# Patient Record
Sex: MALE | Race: WHITE | NOT HISPANIC OR LATINO | Employment: FULL TIME | URBAN - METROPOLITAN AREA
[De-identification: names, ages, dates, MRNs, and addresses within clinical notes are randomized per-mention and may not be internally consistent; named-entity substitution may affect disease eponyms.]

---

## 2019-06-24 ENCOUNTER — TELEPHONE (OUTPATIENT)
Dept: NEUROLOGY | Facility: CLINIC | Age: 49
End: 2019-06-24

## 2019-06-24 DIAGNOSIS — G40.109 SEIZURE, TEMPORAL LOBE (HCC): Primary | ICD-10-CM

## 2019-06-30 ENCOUNTER — RESULTS ENCOUNTER (OUTPATIENT)
Dept: NEUROLOGY | Facility: CLINIC | Age: 49
End: 2019-06-30

## 2019-06-30 DIAGNOSIS — G40.109 SEIZURE, TEMPORAL LOBE (HCC): ICD-10-CM

## 2019-07-12 ENCOUNTER — OFFICE VISIT (OUTPATIENT)
Dept: NEUROLOGY | Facility: CLINIC | Age: 49
End: 2019-07-12

## 2019-07-12 ENCOUNTER — TELEPHONE (OUTPATIENT)
Dept: NEUROLOGY | Facility: CLINIC | Age: 49
End: 2019-07-12

## 2019-07-12 VITALS
WEIGHT: 293 LBS | HEART RATE: 60 BPM | BODY MASS INDEX: 41.95 KG/M2 | HEIGHT: 70 IN | SYSTOLIC BLOOD PRESSURE: 135 MMHG | DIASTOLIC BLOOD PRESSURE: 81 MMHG

## 2019-07-12 DIAGNOSIS — G47.33 OBSTRUCTIVE SLEEP APNEA: Primary | ICD-10-CM

## 2019-07-12 PROBLEM — Z83.3 FAMILY HISTORY OF DIABETES MELLITUS: Status: ACTIVE | Noted: 2019-07-12

## 2019-07-12 PROBLEM — G40.909 SEIZURE DISORDER (HCC): Status: ACTIVE | Noted: 2019-07-12

## 2019-07-12 PROBLEM — G47.30 SLEEP APNEA: Status: ACTIVE | Noted: 2019-07-12

## 2019-07-12 PROBLEM — I10 HYPERTENSION: Status: ACTIVE | Noted: 2019-07-12

## 2019-07-12 PROCEDURE — 99214 OFFICE O/P EST MOD 30 MIN: CPT | Performed by: PSYCHIATRY & NEUROLOGY

## 2019-07-12 RX ORDER — HYDROCHLOROTHIAZIDE 25 MG/1
25 TABLET ORAL DAILY
Refills: 4 | COMMUNITY
Start: 2019-06-20

## 2019-07-12 RX ORDER — ATENOLOL 25 MG/1
25 TABLET ORAL 2 TIMES DAILY
Refills: 4 | COMMUNITY
Start: 2019-06-20

## 2019-07-12 RX ORDER — CARBAMAZEPINE 200 MG/1
200 TABLET ORAL 4 TIMES DAILY
Refills: 11 | COMMUNITY
Start: 2019-05-25 | End: 2019-07-12 | Stop reason: SDUPTHER

## 2019-07-12 RX ORDER — CARBAMAZEPINE 200 MG/1
200 TABLET ORAL 4 TIMES DAILY
Qty: 60 TABLET | Refills: 11 | Status: SHIPPED | OUTPATIENT
Start: 2019-07-12 | End: 2020-06-15

## 2019-07-12 RX ORDER — ASPIRIN 81 MG/1
TABLET ORAL
COMMUNITY
Start: 2017-05-31

## 2019-07-12 RX ORDER — SIMVASTATIN 20 MG
20 TABLET ORAL
Refills: 12 | COMMUNITY
Start: 2019-06-20 | End: 2021-09-27 | Stop reason: SDUPTHER

## 2019-07-12 NOTE — PROGRESS NOTES
Sleep medicine follow-up visit    Zenon Jerry   1970  49 y.o. male   DATE OF SERVICE: 7/12/2019       Patient  Is here for follow up on MATILDE, he is currently using full face mask he uses  Ruel ARAGON.  He is sleeping well.   Having problems with ffm wishes to try nasal mask , has used in the past  SLEEP TESTING HISTORY:    On NPSG at [facility] , 2004 patient had Moderate obstructive sleep apnea syndrome with RDI of 79 per sleep hour, minimum SpO2 of 94%    The compliance data reviewed and the patient is on CPAP therapy at 13-20 cm/H2O and compliance data indicates excellent compliance with 90% usage for more than 4 hours with an average usage of 5 hours 35 minutes. AHI down to 12.7. Large air leak [id number] with CPAP therapy and mean CPAP pressure 13 cm of water.  The patient's hypersomnia also resolved with [place] Sleepiness Scale score of 6 with CPAP therapy    No seizures  Labs from this am pending     Review of Systems   Constitutional: Negative for activity change and appetite change.   HENT: Negative for mouth sores and postnasal drip.    Eyes: Negative for pain and itching.   Respiratory: Negative for cough and choking.    Cardiovascular: Negative for chest pain and leg swelling.   Gastrointestinal: Negative for abdominal pain and anal bleeding.   Endocrine: Negative for cold intolerance and heat intolerance.   Genitourinary: Negative for difficulty urinating and testicular pain.   Musculoskeletal: Negative for neck pain.   Neurological: Negative for seizures and headaches.   Psychiatric/Behavioral: Negative for confusion, dysphoric mood and sleep disturbance.     I reviewed and addressed ROS entered by MA.      Current Outpatient Medications:   •  aspirin 81 MG EC tablet, ASPIRIN 81 MG TBEC, Disp: , Rfl:   •  atenolol (TENORMIN) 25 MG tablet, Take 25 mg by mouth 2 (Two) Times a Day., Disp: , Rfl: 4  •  carBAMazepine (TEGretol) 200 MG tablet, Take 200 mg by mouth 4 (Four) Times a Day., Disp: ,  Rfl: 11  •  hydrochlorothiazide (HYDRODIURIL) 25 MG tablet, Take 25 mg by mouth Daily. for blood pressure, Disp: , Rfl: 4  •  simvastatin (ZOCOR) 20 MG tablet, Take 20 mg by mouth every night at bedtime., Disp: , Rfl: 12  Allergies not on file     PHYSICAL EXAMINATION:  Vitals:    07/12/19 1100   BP: 135/81   Pulse: 60      Body mass index is 42.04 kg/m².       HEENT: Normal.    CARDIAC: Normal.   LUNGS: Clear to auscultation.   EXTREMITIES: No edema.     IMPRESSION: Patient with obstructive sleep apnea syndrome not doing well in terms of air leak, will change to nasal mask , decrease pressure to 10-20, check scr in one month if not improved then in lab titration study, may need BiPAP.    Seizure do , doing well continue tegretol will review labs when available    Obesity, pt encouraged to lose weight.       EPWORTH SLEEPINESS SCALE  Sitting and reading  0  WatchingTV  1  Sitting, inactive, in a public place  0  As a passenger in a car for 1 hour w/o a break  0  Lying down to rest in the afternoon  3  Sitting and talking to someone  0  Sitting quietly after a lunch  3  In a car, while stopped for traffic or a light  0  Total 6        This document has been electronically signed by Joseph Seipel, MD on July 12, 2019 11:28 AM

## 2020-06-15 RX ORDER — CARBAMAZEPINE 200 MG/1
TABLET ORAL
Qty: 60 TABLET | Refills: 11 | Status: SHIPPED | OUTPATIENT
Start: 2020-06-15 | End: 2020-07-30 | Stop reason: SDUPTHER

## 2020-07-06 ENCOUNTER — TELEPHONE (OUTPATIENT)
Dept: NEUROLOGY | Facility: CLINIC | Age: 50
End: 2020-07-06

## 2020-07-06 DIAGNOSIS — G40.109 SEIZURE, TEMPORAL LOBE: Primary | ICD-10-CM

## 2020-07-06 NOTE — TELEPHONE ENCOUNTER
Forward to Dr. Seipel so he can put in the order for the labs and will fax to [phone]  cbv  cmp  carbamazepine

## 2020-07-06 NOTE — TELEPHONE ENCOUNTER
PT CALLED  AND STATED HE NEEDED LAB WORK FAXED TO [facility] IN [place] .  HE DIDN'T HAVE A FAX NUMBER FOR THEM HIS BEST CALL BACK NUMBER [phone]

## 2020-07-23 DIAGNOSIS — G40.109 SEIZURE, TEMPORAL LOBE (HCC): Primary | ICD-10-CM

## 2020-07-30 ENCOUNTER — OFFICE VISIT (OUTPATIENT)
Dept: NEUROLOGY | Facility: CLINIC | Age: 50
End: 2020-07-30

## 2020-07-30 VITALS
HEIGHT: 70 IN | BODY MASS INDEX: 36.36 KG/M2 | WEIGHT: 254 LBS | SYSTOLIC BLOOD PRESSURE: 124 MMHG | TEMPERATURE: 98 F | HEART RATE: 79 BPM | DIASTOLIC BLOOD PRESSURE: 79 MMHG

## 2020-07-30 DIAGNOSIS — G47.33 OBSTRUCTIVE SLEEP APNEA SYNDROME: Primary | ICD-10-CM

## 2020-07-30 DIAGNOSIS — G40.909 SEIZURE DISORDER (HCC): ICD-10-CM

## 2020-07-30 PROCEDURE — 99214 OFFICE O/P EST MOD 30 MIN: CPT | Performed by: PSYCHIATRY & NEUROLOGY

## 2020-07-30 RX ORDER — CARBAMAZEPINE 200 MG/1
200 TABLET ORAL 4 TIMES DAILY
Qty: 120 TABLET | Refills: 11 | Status: SHIPPED | OUTPATIENT
Start: 2020-07-30 | End: 2021-08-07

## 2020-07-30 NOTE — PROGRESS NOTES
Sleep medicine follow-up visit    Zenon Jerry   1970  50 y.o. male   DATE OF SERVICE: 7/30/2020     MATILDE, yearly f/u for CPAP compliance, he is currently using full face mask and goes through [employer] for supplies.   He is sleeping well.     SLEEP TESTING HISTORY:    On NPSG at [facility] , 2004 patient had Moderate obstructive sleep apnea syndrome with RDI of 79 per sleep hour, minimum SpO2 of 94%    The compliance data reviewed and the patient is on CPAP therapy at 10-20 cm/H2O and compliance data19% usage for more than 4 hours with an average usage of 3 hours 35 minutes. AHI down to 17     Pt is not sleepy w or wo using cpap    Seizure disorder, doing well with tegretol no seizures.    Obesity, BMI-36.4    Review of Systems   Constitutional: Negative for activity change and appetite change.   HENT: Negative for mouth sores and postnasal drip.    Eyes: Negative for pain and itching.   Respiratory: Positive for apnea. Negative for cough and choking.    Cardiovascular: Negative for chest pain and leg swelling.   Gastrointestinal: Negative for abdominal pain and anal bleeding.   Endocrine: Negative for cold intolerance and heat intolerance.   Genitourinary: Negative for difficulty urinating and testicular pain.   Musculoskeletal: Negative for neck pain.   Neurological: Negative for seizures and headaches.   Psychiatric/Behavioral: Negative for confusion, dysphoric mood and sleep disturbance.     I reviewed and addressed ROS entered by MA.        The following portions of the patient's history were reviewed and updated as appropriate: allergies, current medications, past family history, past medical history, past social history, past surgical history and problem list.      Family History   Problem Relation Age of Onset   • Seizures Brother    • Diabetes Other    • Heart disease Other    • Hypertension Other    • Cancer Other        Past Medical History:   Diagnosis Date   • Fracture of leg     right   • History of  blood clotting disorder    • Hyperlipidemia    • Hypertension    • Seizure disorder (CMS/[facility])     last seizure 07/10/2008   • Sleep apnea     ahi 79 on npsg 2004, auto cpap 9-15       Social History     Socioeconomic History   • Marital status: Single     Spouse name: Not on file   • Number of children: Not on file   • Years of education: Not on file   • Highest education level: Not on file   Tobacco Use   • Smoking status: Never Smoker   • Smokeless tobacco: Never Used   Substance and Sexual Activity   • Alcohol use: Not Currently   • Drug use: Never   • Sexual activity: Defer         Current Outpatient Medications:   •  aspirin 81 MG EC tablet, ASPIRIN 81 MG TBEC, Disp: , Rfl:   •  atenolol (TENORMIN) 25 MG tablet, Take 25 mg by mouth 2 (Two) Times a Day., Disp: , Rfl: 4  •  carBAMazepine (TEGretol) 200 MG tablet, Take 1 tablet by mouth 4 (Four) Times a Day., Disp: 120 tablet, Rfl: 11  •  hydrochlorothiazide (HYDRODIURIL) 25 MG tablet, Take 25 mg by mouth Daily. for blood pressure, Disp: , Rfl: 4  •  simvastatin (ZOCOR) 20 MG tablet, Take 20 mg by mouth every night at bedtime., Disp: , Rfl: 12    No Known Allergies     PHYSICAL EXAMINATION:  Vitals:    07/30/20 1349   BP: 124/79   Pulse: 79   Temp: 98 °F (36.7 °C)      Body mass index is 36.45 kg/m².       HEENT: Normal.      EXTREMITIES: No edema.     EPWORTH SLEEPINESS SCALE  Sitting and reading  0  WatchingTV  2  Sitting, inactive, in a public place  0  As a passenger in a car for 1 hour w/o a break  0  Lying down to rest in the afternoon  2  Sitting and talking to someone  0  Sitting quietly after a lunch  0  In a car, while stopped for traffic or a light  0  Total 4        IMPRESSION:     Patient with obstructive sleep apnea  Not compliant with pap, severe air leak and unknown control of events due to massive air leak  ---will order npsg and re titration study    Seizure do   --continue tegretol level was 9.5 cbc is ok    This document has been electronically  signed by Joseph Seipel, MD on July 30, 2020 14:30

## 2020-07-31 ENCOUNTER — TELEPHONE (OUTPATIENT)
Dept: NEUROLOGY | Facility: CLINIC | Age: 50
End: 2020-07-31

## 2020-07-31 DIAGNOSIS — G47.33 OBSTRUCTIVE SLEEP APNEA SYNDROME: Primary | ICD-10-CM

## 2020-07-31 NOTE — TELEPHONE ENCOUNTER
----- Message from Joseph F Seipel, MD sent at 7/30/2020  2:19 PM EDT -----  full face mask and goes through [employer]

## 2020-08-06 ENCOUNTER — RESULTS ENCOUNTER (OUTPATIENT)
Dept: NEUROLOGY | Facility: CLINIC | Age: 50
End: 2020-08-06

## 2020-08-06 DIAGNOSIS — G40.109 SEIZURE, TEMPORAL LOBE (HCC): ICD-10-CM

## 2020-08-22 DIAGNOSIS — G47.33 OBSTRUCTIVE SLEEP APNEA SYNDROME: Primary | ICD-10-CM

## 2020-08-24 ENCOUNTER — TELEPHONE (OUTPATIENT)
Dept: NEUROLOGY | Facility: CLINIC | Age: 50
End: 2020-08-24

## 2020-08-24 NOTE — TELEPHONE ENCOUNTER
ESTEFANIA,  [phone]  YOU CAN LEAVE A MESSAGE    WANTS TO KNOW HOW MUCH HIS COST ON THE SLEEP STUDY IS GOING TO BE. WAS IS GOING TOWARDS  DEDUCTIBLE.    HE GOT PAPERS BUT DID NOT UNDERSTAND THEM

## 2020-09-03 ENCOUNTER — TELEPHONE (OUTPATIENT)
Dept: NEUROLOGY | Facility: CLINIC | Age: 50
End: 2020-09-03

## 2020-09-03 DIAGNOSIS — G47.33 OBSTRUCTIVE SLEEP APNEA: Primary | ICD-10-CM

## 2020-09-10 ENCOUNTER — TELEPHONE (OUTPATIENT)
Dept: NEUROLOGY | Facility: CLINIC | Age: 50
End: 2020-09-10

## 2020-09-10 DIAGNOSIS — G47.33 OBSTRUCTIVE SLEEP APNEA: Primary | ICD-10-CM

## 2020-09-10 NOTE — TELEPHONE ENCOUNTER
Pt called states he is wearing pap 7-8 hrs almost every night and the pap isn't picking up the time. Report looks odd. Will have [place] check pap.

## 2020-09-16 ENCOUNTER — TELEPHONE (OUTPATIENT)
Dept: NEUROLOGY | Facility: CLINIC | Age: 50
End: 2020-09-16

## 2020-09-16 DIAGNOSIS — G47.33 OBSTRUCTIVE SLEEP APNEA: Primary | ICD-10-CM

## 2020-10-06 ENCOUNTER — TELEPHONE (OUTPATIENT)
Dept: NEUROLOGY | Facility: CLINIC | Age: 50
End: 2020-10-06

## 2020-10-06 DIAGNOSIS — G47.33 OBSTRUCTIVE SLEEP APNEA: Primary | ICD-10-CM

## 2021-08-03 DIAGNOSIS — G40.909 SEIZURE DISORDER (HCC): Primary | ICD-10-CM

## 2021-08-07 RX ORDER — CARBAMAZEPINE 200 MG/1
TABLET ORAL
Qty: 120 TABLET | Refills: 1 | Status: SHIPPED | OUTPATIENT
Start: 2021-08-07 | End: 2021-08-30

## 2021-08-30 RX ORDER — CARBAMAZEPINE 200 MG/1
TABLET ORAL
Qty: 120 TABLET | Refills: 1 | Status: SHIPPED | OUTPATIENT
Start: 2021-08-30 | End: 2021-09-17 | Stop reason: SDUPTHER

## 2021-09-17 ENCOUNTER — TELEPHONE (OUTPATIENT)
Dept: NEUROLOGY | Facility: CLINIC | Age: 51
End: 2021-09-17

## 2021-09-17 RX ORDER — CARBAMAZEPINE 200 MG/1
200 TABLET ORAL 4 TIMES DAILY
Qty: 120 TABLET | Refills: 0 | Status: SHIPPED | OUTPATIENT
Start: 2021-09-17 | End: 2021-09-27 | Stop reason: SDUPTHER

## 2021-09-17 NOTE — TELEPHONE ENCOUNTER
Caller: Zenon Jerry    Relationship: Self    Additional notes: PATIENT WOULD LIKE LAB ORDERS FAXED TO [facility].   FAX: [phone]     THANKS

## 2021-09-24 NOTE — PROGRESS NOTES
"Chief Complaint  Sleep Apnea    Subjective          Zenon Jerry presents to [facility] NEUROLOGY  History of Present Illness  MATILDE, yearly f/u for CPAP compliance,patient states he is benefiting from pap therapy, he currently using full face mask and goes through [employer] for supplies.       Sleep testing history:    On NPSG at [facility] , 2004 patient had Moderate obstructive sleep apnea syndrome with RDI of 79 per sleep hour, minimum SpO2 of 94%    PAP download:  The patient is on CPAP therapy at 10-20 cm/H2O.   Data indicates compliance. With 69% usage for more than 4 hours with an average usage of 5 hours 27 minutes. AHI down to 5.9 .  Average pressures 10.0.  Average large leak 5hr.       [place] Sleepiness Scale:  Sitting and reading 1 WatchingTV 2  Sitting, inactive, in a public place 0  As a passenger in a car for 1 hour w/o a break  1  Lying down to rest in the afternoon  3  Sitting and talking to someone  0  Sitting quietly after a lunch  1  In a car, while stopped for traffic or a light  0  Total 8  -  Seizure disorder, doing well with tegretol  200mg qid no side effects no seizures since 2008    Review of Systems   Constitutional: Negative for chills and fever.   HENT: Negative for sinus pressure and sinus pain.    Eyes: Negative for pain and itching.   Respiratory: Negative for cough and shortness of breath.    Cardiovascular: Negative for chest pain.   Gastrointestinal: Negative for abdominal distention and abdominal pain.   Endocrine: Negative for cold intolerance and heat intolerance.   Genitourinary: Negative for frequency and urgency.   Musculoskeletal: Negative for neck pain and neck stiffness.   Neurological: Negative for dizziness and headaches.   Psychiatric/Behavioral: Negative for decreased concentration and sleep disturbance.         Objective   Vital Signs:   /75   Pulse 54   Temp 98.4 °F (36.9 °C) (Temporal)   Resp 16   Ht 177.8 cm (70\")   Wt 111 kg (244 lb)   " BMI 35.01 kg/m²     Physical Exam   Result Review :                 Assessment and Plan    Diagnoses and all orders for this visit:    1. Obstructive sleep apnea syndrome (Primary)    Other orders  -     carBAMazepine (TEGretol) 200 MG tablet; Take 1 tablet by mouth 4 (Four) Times a Day.  Dispense: 120 tablet; Refill: 11    pt informed of the CPAP recallThe patient is compliant with and benefiting from PAP therapy.  Needs mask fit due to excessive air leak    Continue tegretal  Cbc na and teg level all ok         Follow Up   Return in about 1 year (around 9/27/2022).    Patient was given instructions and counseling regarding his condition or for health maintenance advice. Please see specific information pulled into the AVS if appropriate.       This document has been electronically signed by Joseph Seipel, MD on September 27, 2021 14:22 EDT

## 2021-09-27 ENCOUNTER — OFFICE VISIT (OUTPATIENT)
Dept: NEUROLOGY | Facility: CLINIC | Age: 51
End: 2021-09-27

## 2021-09-27 VITALS
BODY MASS INDEX: 34.93 KG/M2 | TEMPERATURE: 98.4 F | RESPIRATION RATE: 16 BRPM | HEART RATE: 54 BPM | HEIGHT: 70 IN | SYSTOLIC BLOOD PRESSURE: 115 MMHG | DIASTOLIC BLOOD PRESSURE: 75 MMHG | WEIGHT: 244 LBS

## 2021-09-27 DIAGNOSIS — G47.33 OBSTRUCTIVE SLEEP APNEA SYNDROME: Primary | ICD-10-CM

## 2021-09-27 PROBLEM — G40.909 SEIZURE DISORDER (HCC): Status: RESOLVED | Noted: 2019-07-12 | Resolved: 2021-09-27

## 2021-09-27 PROBLEM — E78.5 HYPERLIPIDEMIA: Status: ACTIVE | Noted: 2021-09-27

## 2021-09-27 PROCEDURE — 99214 OFFICE O/P EST MOD 30 MIN: CPT | Performed by: PSYCHIATRY & NEUROLOGY

## 2021-09-27 RX ORDER — ATORVASTATIN CALCIUM 40 MG/1
40 TABLET, FILM COATED ORAL
COMMUNITY
Start: 2021-07-31

## 2021-09-27 RX ORDER — CARBAMAZEPINE 200 MG/1
200 TABLET ORAL 4 TIMES DAILY
Qty: 120 TABLET | Refills: 11 | Status: SHIPPED | OUTPATIENT
Start: 2021-09-27 | End: 2022-10-25

## 2021-09-27 RX ORDER — COLLAGENASE SANTYL 250 [ARB'U]/G
OINTMENT TOPICAL
COMMUNITY
Start: 2021-08-19

## 2021-09-28 ENCOUNTER — TELEPHONE (OUTPATIENT)
Dept: NEUROLOGY | Facility: CLINIC | Age: 51
End: 2021-09-28

## 2021-09-28 DIAGNOSIS — G47.33 OBSTRUCTIVE SLEEP APNEA SYNDROME: Primary | ICD-10-CM

## 2021-09-28 NOTE — TELEPHONE ENCOUNTER
----- Message from Joseph F Seipel, MD sent at 9/27/2021  2:16 PM EDT -----   face mask and goes through [facility]

## 2021-09-28 NOTE — TELEPHONE ENCOUNTER
----- Message from Joseph F Seipel, MD sent at 9/27/2021  2:17 PM EDT -----  full face mask and goes through [facility]

## 2022-10-25 RX ORDER — CARBAMAZEPINE 200 MG/1
200 TABLET ORAL 4 TIMES DAILY
Qty: 360 TABLET | Refills: 3 | Status: SHIPPED | OUTPATIENT
Start: 2022-10-25

## 2022-11-01 ENCOUNTER — OFFICE VISIT (OUTPATIENT)
Dept: NEUROLOGY | Facility: CLINIC | Age: 52
End: 2022-11-01

## 2022-11-01 VITALS
TEMPERATURE: 98.7 F | WEIGHT: 255.2 LBS | BODY MASS INDEX: 36.54 KG/M2 | OXYGEN SATURATION: 98 % | SYSTOLIC BLOOD PRESSURE: 130 MMHG | HEART RATE: 60 BPM | DIASTOLIC BLOOD PRESSURE: 83 MMHG | HEIGHT: 70 IN

## 2022-11-01 DIAGNOSIS — G47.33 OBSTRUCTIVE SLEEP APNEA SYNDROME: Primary | ICD-10-CM

## 2022-11-01 DIAGNOSIS — G40.909 SEIZURE DISORDER: ICD-10-CM

## 2022-11-01 PROBLEM — R42 DIZZINESS: Status: ACTIVE | Noted: 2021-10-30

## 2022-11-01 PROCEDURE — 99214 OFFICE O/P EST MOD 30 MIN: CPT | Performed by: PSYCHIATRY & NEUROLOGY

## 2022-11-01 NOTE — PROGRESS NOTES
"Chief Complaint  Sleep Apnea    Subjective          Zenon Jerry presents to [facility] NEUROLOGY  History of Present Illness  MATILDE, yearly f/u for CPAP compliance,patient states he is benefiting from pap therapy, he currently use a  FFM  and goes through [employer] for supplies.         Sleep testing history:    On NPSG at [facility] , 2004 patient had Moderate obstructive sleep apnea syndrome with RDI of 79 per sleep hour, minimum SpO2 of 94%     PAP download:  Pt is on cpap 10-20  Has not been aboe to use cpap recently due to broken mask and head gear.   Was using about 70% of the time about 5hours per night on scr last year, avg 2hr per night recently       [place] Sleepiness Scale:  Sitting and reading 3 WatchingTV 3  Sitting, inactive, in a public place 3  As a passenger in a car for 1 hour w/o a break  3  Lying down to rest in the afternoon  3  Sitting and talking to someone  3  Sitting quietly after a lunch  3  In a car, while stopped for traffic or a light  3  Total 25    Review of Systems   Constitutional: Positive for fatigue.   Respiratory: Negative for apnea.    Neurological: Negative for seizures.   All other systems reviewed and are negative.        Objective   Vital Signs:   /83   Pulse 60   Temp 98.7 °F (37.1 °C)   Ht 177.8 cm (70\")   Wt 116 kg (255 lb 3.2 oz)   SpO2 98%   BMI 36.62 kg/m²     Physical Exam  Vitals reviewed.   Constitutional:       Appearance: Normal appearance.   Cardiovascular:      Pulses: Normal pulses.   Pulmonary:      Effort: Pulmonary effort is normal.   Neurological:      General: No focal deficit present.      Mental Status: He is alert.          Labs tegretal level 9, Na level 137 wbc 4.9 all good    Result Review :                 Assessment and Plan    Diagnoses and all orders for this visit:    1. Obstructive sleep apnea syndrome (Primary)    2. Seizure disorder (HCC)      Continue cpap 10-20 with ffm  Continue tegretol for the seizure " disorder  The patient is compliant with and benefiting from PAP therapy.      Follow Up   Return in about 1 year (around 11/1/2023).    Patient was given instructions and counseling regarding his condition or for health maintenance advice. Please see specific information pulled into the AVS if appropriate.       This document has been electronically signed by Joseph Seipel, MD on November 1, 2022 16:26 EDT

## 2023-11-22 NOTE — PROGRESS NOTES
"Chief Complaint  Sleep Apnea    Subjective          Zenon Jerry presents to [facility] NEUROLOGY  History of Present Illness    MATILDE, yearly f/u for CPAP compliance,patient states he is benefiting from pap therapy,   he currently use a  FFM  and goes through [employer] for supplies.   He is needing a new strap for mask.     Sleep testing history:    On NPSG at [facility] , 2004 patient had severe obstructive sleep apnea syndrome with RDI of 79 per sleep hour, minimum SpO2 of 94%    PAP download:  The patient is on CPAP therapy at 10-20 cm/H2O.   Data indicates Excellent compliance. With 100% usage for more than 4 hours     The patient's hypersomnia has resolved       [place] Sleepiness Scale:  Sitting and reading 0 WatchingTV 1  Sitting, inactive, in a public place 0  As a passenger in a car for 1 hour w/o a break  0  Lying down to rest in the afternoon  2  Sitting and talking to someone  0  Sitting quietly after a lunch  1  In a car, while stopped for traffic or a light  0  Total 4    No seizures ,  On generic tegretal.    Review of Systems   Constitutional:  Negative for fatigue.   Neurological:  Negative for tremors and seizures.   Psychiatric/Behavioral:  Negative for sleep disturbance.    All other systems reviewed and are negative.     Objective   Vital Signs:   /77   Pulse 76   Ht 177.8 cm (70\")   Wt 119 kg (262 lb)   BMI 37.59 kg/m²     Physical Exam  Vitals reviewed.   Constitutional:       Appearance: Normal appearance.   HENT:      Nose: Nose normal.   Eyes:      Conjunctiva/sclera: Conjunctivae normal.   Pulmonary:      Effort: Pulmonary effort is normal. No respiratory distress.   Neurological:      Mental Status: He is alert and oriented to person, place, and time.   Psychiatric:         Mood and Affect: Mood normal.         Behavior: Behavior normal.        Result Review :                 Assessment and Plan    Diagnoses and all orders for this visit:    1. Obstructive sleep " apnea syndrome (Primary)  -     PAP Therapy    2. Seizure disorder  -     carBAMazepine (TEGretol) 200 MG tablet; Take 1 tablet by mouth 4 (Four) Times a Day.  Dispense: 360 tablet; Refill: 3  -     Carbamazepine Level, Total; Future  -     Comprehensive Metabolic Panel; Future  -     CBC (No Diff); Future      Will order new cpap machine as current machine flow sensors are not functioning   The patient is compliant with and benefiting from PAP therapy.    Continue tegretal  Check labs      Follow Up   Return in about 1 year (around 11/28/2024).    Patient was given instructions and counseling regarding his condition or for health maintenance advice. Please see specific information pulled into the AVS if appropriate.       This document has been electronically signed by Joseph Seipel, MD on November 28, 2023 16:21 EST

## 2023-11-28 ENCOUNTER — OFFICE VISIT (OUTPATIENT)
Dept: NEUROLOGY | Facility: CLINIC | Age: 53
End: 2023-11-28
Payer: COMMERCIAL

## 2023-11-28 VITALS
SYSTOLIC BLOOD PRESSURE: 134 MMHG | WEIGHT: 262 LBS | BODY MASS INDEX: 37.51 KG/M2 | DIASTOLIC BLOOD PRESSURE: 77 MMHG | HEIGHT: 70 IN | HEART RATE: 76 BPM

## 2023-11-28 DIAGNOSIS — G40.909 SEIZURE DISORDER: ICD-10-CM

## 2023-11-28 DIAGNOSIS — G47.33 OBSTRUCTIVE SLEEP APNEA SYNDROME: Primary | ICD-10-CM

## 2023-11-28 PROCEDURE — 99214 OFFICE O/P EST MOD 30 MIN: CPT | Performed by: PSYCHIATRY & NEUROLOGY

## 2023-11-28 RX ORDER — CARBAMAZEPINE 200 MG/1
200 TABLET ORAL 4 TIMES DAILY
Qty: 360 TABLET | Refills: 3 | Status: SHIPPED | OUTPATIENT
Start: 2023-11-28

## 2023-12-11 ENCOUNTER — TELEPHONE (OUTPATIENT)
Dept: NEUROLOGY | Facility: CLINIC | Age: 53
End: 2023-12-11

## 2023-12-11 NOTE — TELEPHONE ENCOUNTER
PATIENT CALLING TO ADVISE, HE DID NOT QUALIFY FOR NEW CPAP MACHINE.    PLEASE ADVISE PATIENT NEXT STEPS    THANK YOU

## 2024-01-03 ENCOUNTER — TELEPHONE (OUTPATIENT)
Dept: NEUROLOGY | Facility: CLINIC | Age: 54
End: 2024-01-03
Payer: COMMERCIAL

## 2024-01-03 NOTE — TELEPHONE ENCOUNTER
----- Message from Joseph F Seipel, MD sent at 1/2/2024  9:09 PM EST -----  Cmp and cbc are normal

## 2024-01-08 ENCOUNTER — TELEPHONE (OUTPATIENT)
Dept: NEUROLOGY | Facility: CLINIC | Age: 54
End: 2024-01-08
Payer: COMMERCIAL

## 2024-01-08 NOTE — TELEPHONE ENCOUNTER
----- Message from Joseph F Seipel, MD sent at 1/8/2024  9:44 AM EST -----  The tegretol total and free levels were therapeutic / in the good range

## 2024-12-27 ENCOUNTER — TELEPHONE (OUTPATIENT)
Dept: NEUROLOGY | Facility: CLINIC | Age: 54
End: 2024-12-27
Payer: COMMERCIAL

## 2024-12-27 DIAGNOSIS — G40.909 SEIZURE DISORDER: ICD-10-CM

## 2024-12-27 RX ORDER — CARBAMAZEPINE 200 MG/1
200 TABLET ORAL 4 TIMES DAILY
Qty: 360 TABLET | Refills: 3 | OUTPATIENT
Start: 2024-12-27

## 2024-12-27 NOTE — TELEPHONE ENCOUNTER
Caller: Zenon Jerry    Relationship: Self    Best call back number: [phone]    What was the call regarding: PT CALLED DUE TO THE PHARMACY TOLD HIM HIS RX IS DENIED UNTIL HE SCHEDULED HIS FOLLOW UP APPT. ITS SET UP FOR 2/10/25.     PLEASE ADVISE  THANK YOU

## 2024-12-30 RX ORDER — CARBAMAZEPINE 200 MG/1
200 TABLET ORAL 4 TIMES DAILY
Qty: 360 TABLET | Refills: 3 | OUTPATIENT
Start: 2024-12-30

## 2024-12-30 NOTE — TELEPHONE ENCOUNTER
"  Caller: Jerry Zenon JOSEPH    Relationship: Self    Best call back number: .MOBILE    Requested Prescriptions:   Requested Prescriptions     Pending Prescriptions Disp Refills    carBAMazepine (TEGretol) 200 MG tablet 360 tablet 3     Sig: Take 1 tablet by mouth 4 (Four) Times a Day.     Refused Prescriptions Disp Refills    carBAMazepine (TEGretol) 200 MG tablet [Pharmacy Med Name: CARBAMAZEPINE 200 MG TABLET] 360 tablet 3     Sig: TAKE 1 TABLET BY MOUTH 4 TIMES A DAY.     Refused By: EMANUEL JAIME     Reason for Refusal: Patient needs an appointment        Pharmacy where request should be sent: [facility] #[id number] - [place], [place] - [address] AT [address] [facility] - [phone] [facility] [phone]      Last office visit with prescribing clinician: 11/28/2023   Last telemedicine visit with prescribing clinician: Visit date not found   Next office visit with prescribing clinician: 2/10/25    Additional details provided by patient: PATIENT STATES IF BLOOD WORK IS REQUIRED BEFORE THE APPOINTMENT PLEASE FAX TO [facility] [place]; PATIENT IS REQUESTING A MONTHS WORTH UNTIL NEXT APPOINTMENT.     Does the patient have less than a 3 day supply:  [] Yes  [x] No    Would you like a call back once the refill request has been completed: [] Yes [x] No    If the office needs to give you a call back, can they leave a voicemail: [] Yes [x] No    Roger Fang Rep   12/30/24 10:36 EST       "

## 2025-01-01 DIAGNOSIS — G40.909 SEIZURE DISORDER: ICD-10-CM

## 2025-01-01 RX ORDER — CARBAMAZEPINE 200 MG/1
200 TABLET ORAL 4 TIMES DAILY
Qty: 360 TABLET | Refills: 0 | Status: SHIPPED | OUTPATIENT
Start: 2025-01-01

## 2025-02-06 NOTE — PROGRESS NOTES
"Chief Complaint  Follow-up (MATILDE AND SEIZURES)    Subjective          Zenon Jerry presents to [facility] NEUROLOGY  History of Present Illness  MATILDE, yearly f/u for CPAP compliance,patient states he is benefiting from pap therapy, he uses a  FFM  and goes through [employer] for supplies. patient states he doesn't use machine everynight     SEIZURES- patient states he has not had any recent seizures, currently takes tegretol 200 mg 1 qid na 137, wbc about 5 when recently checked     Sleep testing history:    On NPSG at [facility] , 2004 patient had severe obstructive sleep apnea syndrome with RDI of 79 per sleep hour, minimum SpO2 of 94%    PAP download:  The patient is on CPAP therapy at 10-20 cm/H2O.   Data indicates Excellent compliance. With 66% usage for more than 4 hours with an average usage of 6 hours 54 minutes. AHI down to 5.9 .  Average pressures 11.2.  Average large leak 2hr.     The patient's hypersomnia has resolved       [place] Sleepiness Scale:  Sitting and reading JS [place] Sleepiness: 0 WatchingTV JS [place] Sleepiness: 2  Sitting, inactive, in a public place JS [place] Sleepiness: 0  As a passenger in a car for 1 hour w/o a break  JS [place] Sleepiness: 1  Lying down to rest in the afternoon  JS [place] Sleepiness: 3   Sitting and talking to someone  JS [place] Sleepiness: 0  Sitting quietly after a lunch  JS [place] Sleepiness: 2  In a car, while stopped for traffic or a light  JS [place] Sleepiness: 0  Total 8    Review of Systems   Constitutional:  Negative for fatigue.   Respiratory:  Negative for shortness of breath.    Neurological:  Negative for seizures.   Psychiatric/Behavioral:  Negative for sleep disturbance.        Objective   Vital Signs:   /78   Pulse 61   Resp 16   Ht 177.8 cm (70\")   Wt 122 kg (268 lb)   BMI 38.45 kg/m²     Physical Exam  Vitals reviewed.   Constitutional:       Appearance: Normal appearance.   Pulmonary:      Effort: Pulmonary effort is " normal. No respiratory distress.   Neurological:      Mental Status: He is alert and oriented to person, place, and time.   Psychiatric:         Mood and Affect: Mood normal.      Result Review :                 Assessment and Plan    Diagnoses and all orders for this visit:    1. Obstructive sleep apnea syndrome (Primary)    2. Seizure disorder      Pt encouraged to use CPAP nightly   The patient is compliant with and benefiting from PAP therapy.  Continue tegretal 800mg per day      Follow Up   Return in about 1 year (around 2/10/2026).    Patient was given instructions and counseling regarding his condition or for health maintenance advice. Please see specific information pulled into the AVS if appropriate.       This document has been electronically signed by Joseph Seipel, MD on February 10, 2025 16:26 EST

## 2025-02-10 ENCOUNTER — OFFICE VISIT (OUTPATIENT)
Dept: NEUROLOGY | Facility: CLINIC | Age: 55
End: 2025-02-10
Payer: COMMERCIAL

## 2025-02-10 VITALS
HEART RATE: 61 BPM | SYSTOLIC BLOOD PRESSURE: 133 MMHG | DIASTOLIC BLOOD PRESSURE: 78 MMHG | HEIGHT: 70 IN | BODY MASS INDEX: 38.37 KG/M2 | RESPIRATION RATE: 16 BRPM | WEIGHT: 268 LBS

## 2025-02-10 DIAGNOSIS — G47.33 OBSTRUCTIVE SLEEP APNEA SYNDROME: Primary | ICD-10-CM

## 2025-02-10 DIAGNOSIS — G40.909 SEIZURE DISORDER: ICD-10-CM

## 2025-02-10 PROCEDURE — 99214 OFFICE O/P EST MOD 30 MIN: CPT | Performed by: PSYCHIATRY & NEUROLOGY

## 2025-02-10 RX ORDER — CARBAMAZEPINE 200 MG/1
200 TABLET ORAL 4 TIMES DAILY
Qty: 360 TABLET | Refills: 3 | Status: SHIPPED | OUTPATIENT
Start: 2025-02-10

## 2025-02-10 RX ORDER — LISINOPRIL 10 MG/1
1 TABLET ORAL DAILY
COMMUNITY
Start: 2025-02-03